# Patient Record
Sex: MALE | Race: WHITE | NOT HISPANIC OR LATINO | URBAN - METROPOLITAN AREA
[De-identification: names, ages, dates, MRNs, and addresses within clinical notes are randomized per-mention and may not be internally consistent; named-entity substitution may affect disease eponyms.]

---

## 2018-07-17 ENCOUNTER — INPATIENT (INPATIENT)
Facility: HOSPITAL | Age: 68
LOS: 0 days | End: 2018-07-18
Attending: FAMILY MEDICINE | Admitting: FAMILY MEDICINE
Payer: MEDICARE

## 2018-07-17 VITALS
SYSTOLIC BLOOD PRESSURE: 141 MMHG | RESPIRATION RATE: 18 BRPM | HEART RATE: 56 BPM | DIASTOLIC BLOOD PRESSURE: 79 MMHG | OXYGEN SATURATION: 97 % | TEMPERATURE: 98 F | WEIGHT: 177.91 LBS

## 2018-07-17 DIAGNOSIS — Z98.890 OTHER SPECIFIED POSTPROCEDURAL STATES: Chronic | ICD-10-CM

## 2018-07-17 DIAGNOSIS — Z90.49 ACQUIRED ABSENCE OF OTHER SPECIFIED PARTS OF DIGESTIVE TRACT: Chronic | ICD-10-CM

## 2018-07-17 LAB
ALBUMIN SERPL ELPH-MCNC: 3.5 G/DL — SIGNIFICANT CHANGE UP (ref 3.3–5)
ALP SERPL-CCNC: 75 U/L — SIGNIFICANT CHANGE UP (ref 40–120)
ALT FLD-CCNC: 30 U/L — SIGNIFICANT CHANGE UP (ref 12–78)
AMPHET UR-MCNC: NEGATIVE — SIGNIFICANT CHANGE UP
ANION GAP SERPL CALC-SCNC: 8 MMOL/L — SIGNIFICANT CHANGE UP (ref 5–17)
APAP SERPL-MCNC: <2 UG/ML — LOW (ref 10–30)
APPEARANCE UR: CLEAR — SIGNIFICANT CHANGE UP
APTT BLD: 25.9 SEC — LOW (ref 27.5–37.4)
AST SERPL-CCNC: 37 U/L — SIGNIFICANT CHANGE UP (ref 15–37)
BARBITURATES UR SCN-MCNC: NEGATIVE — SIGNIFICANT CHANGE UP
BASOPHILS # BLD AUTO: 0.03 K/UL — SIGNIFICANT CHANGE UP (ref 0–0.2)
BASOPHILS NFR BLD AUTO: 0.5 % — SIGNIFICANT CHANGE UP (ref 0–2)
BENZODIAZ UR-MCNC: NEGATIVE — SIGNIFICANT CHANGE UP
BILIRUB SERPL-MCNC: 0.4 MG/DL — SIGNIFICANT CHANGE UP (ref 0.2–1.2)
BILIRUB UR-MCNC: NEGATIVE — SIGNIFICANT CHANGE UP
BUN SERPL-MCNC: 16 MG/DL — SIGNIFICANT CHANGE UP (ref 7–23)
CALCIUM SERPL-MCNC: 8.3 MG/DL — LOW (ref 8.5–10.1)
CHLORIDE SERPL-SCNC: 109 MMOL/L — HIGH (ref 96–108)
CK SERPL-CCNC: 74 U/L — SIGNIFICANT CHANGE UP (ref 26–308)
CO2 SERPL-SCNC: 25 MMOL/L — SIGNIFICANT CHANGE UP (ref 22–31)
COCAINE METAB.OTHER UR-MCNC: NEGATIVE — SIGNIFICANT CHANGE UP
COLOR SPEC: YELLOW — SIGNIFICANT CHANGE UP
COMMENT - URINE: SIGNIFICANT CHANGE UP
CREAT SERPL-MCNC: 0.81 MG/DL — SIGNIFICANT CHANGE UP (ref 0.5–1.3)
DIFF PNL FLD: NEGATIVE — SIGNIFICANT CHANGE UP
EOSINOPHIL # BLD AUTO: 0.07 K/UL — SIGNIFICANT CHANGE UP (ref 0–0.5)
EOSINOPHIL NFR BLD AUTO: 1.2 % — SIGNIFICANT CHANGE UP (ref 0–6)
EPI CELLS # UR: SIGNIFICANT CHANGE UP
ERYTHROCYTE [SEDIMENTATION RATE] IN BLOOD: 6 MM/HR — SIGNIFICANT CHANGE UP (ref 0–20)
ETHANOL SERPL-MCNC: <10 MG/DL — SIGNIFICANT CHANGE UP (ref 0–10)
GLUCOSE SERPL-MCNC: 123 MG/DL — HIGH (ref 70–99)
GLUCOSE UR QL: NEGATIVE MG/DL — SIGNIFICANT CHANGE UP
HCT VFR BLD CALC: 41.1 % — SIGNIFICANT CHANGE UP (ref 39–50)
HGB BLD-MCNC: 14.1 G/DL — SIGNIFICANT CHANGE UP (ref 13–17)
IMM GRANULOCYTES NFR BLD AUTO: 0.3 % — SIGNIFICANT CHANGE UP (ref 0–1.5)
INR BLD: 1.04 RATIO — SIGNIFICANT CHANGE UP (ref 0.88–1.16)
KETONES UR-MCNC: NEGATIVE — SIGNIFICANT CHANGE UP
LACTATE SERPL-SCNC: 1.2 MMOL/L — SIGNIFICANT CHANGE UP (ref 0.7–2)
LEUKOCYTE ESTERASE UR-ACNC: ABNORMAL
LYMPHOCYTES # BLD AUTO: 0.85 K/UL — LOW (ref 1–3.3)
LYMPHOCYTES # BLD AUTO: 14.1 % — SIGNIFICANT CHANGE UP (ref 13–44)
MCHC RBC-ENTMCNC: 31.7 PG — SIGNIFICANT CHANGE UP (ref 27–34)
MCHC RBC-ENTMCNC: 34.3 GM/DL — SIGNIFICANT CHANGE UP (ref 32–36)
MCV RBC AUTO: 92.4 FL — SIGNIFICANT CHANGE UP (ref 80–100)
METHADONE UR-MCNC: NEGATIVE — SIGNIFICANT CHANGE UP
MONOCYTES # BLD AUTO: 0.35 K/UL — SIGNIFICANT CHANGE UP (ref 0–0.9)
MONOCYTES NFR BLD AUTO: 5.8 % — SIGNIFICANT CHANGE UP (ref 2–14)
NEUTROPHILS # BLD AUTO: 4.71 K/UL — SIGNIFICANT CHANGE UP (ref 1.8–7.4)
NEUTROPHILS NFR BLD AUTO: 78.1 % — HIGH (ref 43–77)
NITRITE UR-MCNC: NEGATIVE — SIGNIFICANT CHANGE UP
NRBC # BLD: 0 /100 WBCS — SIGNIFICANT CHANGE UP (ref 0–0)
OPIATES UR-MCNC: NEGATIVE — SIGNIFICANT CHANGE UP
PCP SPEC-MCNC: SIGNIFICANT CHANGE UP
PCP SPEC-MCNC: SIGNIFICANT CHANGE UP
PCP UR-MCNC: NEGATIVE — SIGNIFICANT CHANGE UP
PH UR: 5 — SIGNIFICANT CHANGE UP (ref 5–8)
PLATELET # BLD AUTO: 181 K/UL — SIGNIFICANT CHANGE UP (ref 150–400)
POTASSIUM SERPL-MCNC: 4 MMOL/L — SIGNIFICANT CHANGE UP (ref 3.5–5.3)
POTASSIUM SERPL-SCNC: 4 MMOL/L — SIGNIFICANT CHANGE UP (ref 3.5–5.3)
PROT SERPL-MCNC: 6.6 GM/DL — SIGNIFICANT CHANGE UP (ref 6–8.3)
PROT UR-MCNC: 15 MG/DL
PROTHROM AB SERPL-ACNC: 11.2 SEC — SIGNIFICANT CHANGE UP (ref 9.8–12.7)
RBC # BLD: 4.45 M/UL — SIGNIFICANT CHANGE UP (ref 4.2–5.8)
RBC # FLD: 12.1 % — SIGNIFICANT CHANGE UP (ref 10.3–14.5)
RBC CASTS # UR COMP ASSIST: NEGATIVE /HPF — SIGNIFICANT CHANGE UP (ref 0–4)
SALICYLATES SERPL-MCNC: 2.8 MG/DL — SIGNIFICANT CHANGE UP (ref 2.8–20)
SODIUM SERPL-SCNC: 142 MMOL/L — SIGNIFICANT CHANGE UP (ref 135–145)
SP GR SPEC: 1.02 — SIGNIFICANT CHANGE UP (ref 1.01–1.02)
THC UR QL: NEGATIVE — SIGNIFICANT CHANGE UP
TROPONIN I SERPL-MCNC: <0.015 NG/ML — SIGNIFICANT CHANGE UP (ref 0.01–0.04)
TSH SERPL-MCNC: 1.12 UU/ML — SIGNIFICANT CHANGE UP (ref 0.36–3.74)
UROBILINOGEN FLD QL: NEGATIVE MG/DL — SIGNIFICANT CHANGE UP
WBC # BLD: 6.03 K/UL — SIGNIFICANT CHANGE UP (ref 3.8–10.5)
WBC # FLD AUTO: 6.03 K/UL — SIGNIFICANT CHANGE UP (ref 3.8–10.5)
WBC UR QL: SIGNIFICANT CHANGE UP

## 2018-07-17 PROCEDURE — 99221 1ST HOSP IP/OBS SF/LOW 40: CPT

## 2018-07-17 PROCEDURE — 71045 X-RAY EXAM CHEST 1 VIEW: CPT | Mod: 26

## 2018-07-17 PROCEDURE — 70498 CT ANGIOGRAPHY NECK: CPT | Mod: 26

## 2018-07-17 PROCEDURE — 93010 ELECTROCARDIOGRAM REPORT: CPT

## 2018-07-17 PROCEDURE — 70496 CT ANGIOGRAPHY HEAD: CPT | Mod: 26

## 2018-07-17 PROCEDURE — 99285 EMERGENCY DEPT VISIT HI MDM: CPT

## 2018-07-17 RX ORDER — LOSARTAN POTASSIUM 100 MG/1
1 TABLET, FILM COATED ORAL
Qty: 0 | Refills: 0 | COMMUNITY

## 2018-07-17 RX ORDER — ENOXAPARIN SODIUM 100 MG/ML
40 INJECTION SUBCUTANEOUS EVERY 24 HOURS
Qty: 0 | Refills: 0 | Status: DISCONTINUED | OUTPATIENT
Start: 2018-07-17 | End: 2018-07-18

## 2018-07-17 RX ORDER — SODIUM CHLORIDE 9 MG/ML
2000 INJECTION INTRAMUSCULAR; INTRAVENOUS; SUBCUTANEOUS ONCE
Qty: 0 | Refills: 0 | Status: COMPLETED | OUTPATIENT
Start: 2018-07-17 | End: 2018-07-17

## 2018-07-17 RX ORDER — PYRIDOXINE HCL (VITAMIN B6) 100 MG
1 TABLET ORAL
Qty: 0 | Refills: 0 | COMMUNITY

## 2018-07-17 RX ORDER — DIPHENHYDRAMINE HCL 50 MG
25 CAPSULE ORAL ONCE
Qty: 0 | Refills: 0 | Status: DISCONTINUED | OUTPATIENT
Start: 2018-07-17 | End: 2018-07-17

## 2018-07-17 RX ORDER — PREGABALIN 225 MG/1
1 CAPSULE ORAL
Qty: 0 | Refills: 0 | COMMUNITY

## 2018-07-17 RX ORDER — PREGABALIN 225 MG/1
1000 CAPSULE ORAL DAILY
Qty: 0 | Refills: 0 | Status: DISCONTINUED | OUTPATIENT
Start: 2018-07-17 | End: 2018-07-18

## 2018-07-17 RX ORDER — LOSARTAN POTASSIUM 100 MG/1
50 TABLET, FILM COATED ORAL
Qty: 0 | Refills: 0 | Status: DISCONTINUED | OUTPATIENT
Start: 2018-07-17 | End: 2018-07-18

## 2018-07-17 RX ORDER — FOLIC ACID 0.8 MG
1 TABLET ORAL DAILY
Qty: 0 | Refills: 0 | Status: DISCONTINUED | OUTPATIENT
Start: 2018-07-17 | End: 2018-07-18

## 2018-07-17 RX ORDER — DIPHENHYDRAMINE HCL 50 MG
25 CAPSULE ORAL ONCE
Qty: 0 | Refills: 0 | Status: COMPLETED | OUTPATIENT
Start: 2018-07-17 | End: 2018-07-17

## 2018-07-17 RX ORDER — SODIUM CHLORIDE 9 MG/ML
1000 INJECTION INTRAMUSCULAR; INTRAVENOUS; SUBCUTANEOUS
Qty: 0 | Refills: 0 | Status: DISCONTINUED | OUTPATIENT
Start: 2018-07-17 | End: 2018-07-18

## 2018-07-17 RX ORDER — FOLIC ACID 0.8 MG
1 TABLET ORAL
Qty: 0 | Refills: 0 | COMMUNITY

## 2018-07-17 RX ORDER — PYRIDOXINE HCL (VITAMIN B6) 100 MG
100 TABLET ORAL DAILY
Qty: 0 | Refills: 0 | Status: DISCONTINUED | OUTPATIENT
Start: 2018-07-17 | End: 2018-07-18

## 2018-07-17 RX ADMIN — SODIUM CHLORIDE 75 MILLILITER(S): 9 INJECTION INTRAMUSCULAR; INTRAVENOUS; SUBCUTANEOUS at 19:07

## 2018-07-17 RX ADMIN — LOSARTAN POTASSIUM 50 MILLIGRAM(S): 100 TABLET, FILM COATED ORAL at 21:08

## 2018-07-17 RX ADMIN — SODIUM CHLORIDE 2000 MILLILITER(S): 9 INJECTION INTRAMUSCULAR; INTRAVENOUS; SUBCUTANEOUS at 10:50

## 2018-07-17 NOTE — PATIENT PROFILE ADULT. - PMH
B12 nutritional deficiency    HTN (hypertension)    MARLENY (obstructive sleep apnea)  needs sleep study

## 2018-07-17 NOTE — ED PROVIDER NOTE - MEDICAL DECISION MAKING DETAILS
68 y/o male with PMHx of HTN PSHx of appendectomy presents to the ED c/o lethargy starting today while on the way to work around 5:45am. VSS. Pt drowsy and mild delirious. NIH 0. No HA, no fever. DDX. metabolic/hematologic dyspraxia. Plan for CT head and CT contrast of head and neck given shellfish allergy will premedicate even though no contra indication in literature r/o ACS, CXR eval for infection/uti, tox screen due to ETOH, symptomatic treatment, likely admit for AMS. 67 YOM PMHx of HTN PSHx of appendectomy presents to the ED c/o lethargy starting today while on the way to work around 5:45am. VSS. Pt drowsy and mild delirious on exam. NIH 0 on arrival.  Repeat NIH during interview unchanged No HA, no fever. DDX. metabolic/hematologic dyscrasia, ICH, delirium.  Plan for CT head and CT contrast of head and neck. Given shellfish allergy will premedicate.  R/o ACS, CXR eval for infection, urine studies to evaluate for uti, tox screen due to ETOH use, symptomatic treatment, likely admit for AMS.

## 2018-07-17 NOTE — CONSULT NOTE ADULT - ASSESSMENT
67 year old man with sudden onset of feeling of intense sleepiness/lethargy.  Neurological examination is non-focal and workup thus far is negative.  Likely excessive daytime sleepiness due to chronic sleep deprivation, probable MARLENY.  However, given that this event was atypical for the patient, it is worthwhile to check MRI brain.  Will follow.

## 2018-07-17 NOTE — ED ADULT NURSE NOTE - OBJECTIVE STATEMENT
Pt bib coworker who states he was falling asleep at work and that he states he felt weak.  Pt appears lethargic, appears to be sleeping throughout assessment but does respond to voice.  Pt is afebrile, moves all extremities, ambulates well, PERRLA, neuro intact.

## 2018-07-17 NOTE — ED ADULT TRIAGE NOTE - CHIEF COMPLAINT QUOTE
pt presents to ED  due to lethargy pt was driving to work this AM when he became suddenly weak and had to pull over, some confusion noted  pt was having difficulty staying awake pt eval by MD Coyle no CODE STROKE at this time no speech difficulties, no deficits, as per EMS ambulates with a steady gait

## 2018-07-17 NOTE — H&P ADULT - ASSESSMENT
66 y/o male     delirium, can be seoncondary to tia/cva vs hypoxemia vs metobolic disorder, tropx1-neg, cxr- no acute infiltrate, cth-neg  -admit to tele   -ecg in am  -neurochecks q4h  -mri head/neck   -check b12, folate, rpr, tsh, ebv, lyme, esr, crp, cpk  -get abg  -trend troponin  -check bcx, ucx  -gentle iv hydration   -neurology consult  -will need sleep study as outpt    htn   -cont home meds    disposition   -dc home when stable 68 y/o male     lethergy, resolved. can be secondary to tia/cva vs hypoxemia vs metabolic disorder, tropx1-neg, cxr- no acute infiltrate, cth-neg  -admit to tele   -ecg in am  -neurochecks q4h  -mri head/neck   -check b12, folate, rpr, tsh, ebv, lyme, esr, crp, cpk  -get abg  -trend troponin  -check bcx, ucx  -gentle iv hydration   -neurology consult  -will need sleep study as outpt    htn   -cont home meds    disposition   -dc home when stable

## 2018-07-17 NOTE — ED PROVIDER NOTE - NS_ ATTENDINGSCRIBEDETAILS _ED_A_ED_FT
The scribe's documentation has been prepared under my direction and personally reviewed by me in its entirety.  I confirm that the note above accurately reflects all my work, treatment, procedures, and decision making except where otherwise noted or amended by me.  Gavin Coyle M.D.

## 2018-07-17 NOTE — ED PROVIDER NOTE - PROGRESS NOTE DETAILS
ALBINA No significant findings during evaluation today.  Given Pt's delirium PT admitted for AMS.  Given presentation and findings, patient requires hospitalization likely requiring 3 days of inpatient care.  Patient signed out to Dr. Roth.  Patient's history, vital signs, lab results, imaging, pertinent findings, and clinical condition reviewed during sign out.  At sign out patient admitted in hemodynamically stable condition in no acute distress.

## 2018-07-17 NOTE — CONSULT NOTE ADULT - SUBJECTIVE AND OBJECTIVE BOX
Patient is a 67y old  Male who presents with a chief complaint of fatigue, generalized weakness (2018 15:58)      HPI:  68 y/o male with h/o htn, vit b12 deficency, presents to  c/o extreme fatigue and drowsiness while driving to work this morning. He was about 1/2 mile to work when he suddenly felt an overwhelming feeling of sleepiness. When he got to work he went to take a brief nap. He slept for about 10 minutes and when he was awoken he felt slightly disoriented as if he had come out of a deep sleep. Otherwise he had no confusion. He denied any focal weakness. He denies preceding lightheadedness, dizziness, chest pain, shortness of breath, nausea or vomiting.   When a coworker came into the break room, he felt off enough that he told his coworker that he should go to the hospital.  He has chronic difficulty sleeping and is very restless. This was exacerbated over the last several days because he was babysitting a dog. He has felt very sleepy. His wife states that he snores when he is supine and she has heard pauses in his breathing during sleep. He was supposed to have a sleep study but did not follow through.   He has bouts of feeling generalized weakness from time to time. He has attributed this to low vitamin B12 levels, but improvement in his vitamin B12 has not seemed to improve this as per his wife.       In ED, tropx1-neg, cth-neg, vital stable. as per ED attending, patient was unresponsive to painful stimuli upon arrival, better now, aaox3.  s/p 2L NS      PAST MEDICAL HISTORY:  as below    PAST SURGICAL HISTORY: as below    FAMILY HISTORY:  +father had h/o cad,  in 50s due to mi, was obese, heavy smoker and drinker (2018 15:58)      PAST MEDICAL & SURGICAL HISTORY:  History of appendectomy      FAMILY HISTORY:      Social Hx:  Nonsmoker, no drug or alcohol use    MEDICATIONS  (STANDING):  cyanocobalamin 1000 MICROGram(s) Oral daily  enoxaparin Injectable 40 milliGRAM(s) SubCutaneous every 24 hours  folic acid 1 milliGRAM(s) Oral daily  losartan 50 milliGRAM(s) Oral two times a day  pyridoxine 100 milliGRAM(s) Oral daily  sodium chloride 0.9%. 1000 milliLiter(s) (75 mL/Hr) IV Continuous <Continuous>       Allergies    No Known Drug Allergies  shellfish (Unknown)    Intolerances        ROS: Pertinent positives in HPI, all other ROS were reviewed and are negative.      Vital Signs Last 24 Hrs  T(C): 36.4 (2018 17:20), Max: 36.7 (2018 12:02)  T(F): 97.6 (2018 17:20), Max: 98.1 (2018 12:02)  HR: 55 (2018 17:20) (55 - 62)  BP: 126/62 (2018 17:20) (126/62 - 141/79)  BP(mean): --  RR: 17 (2018 16:24) (17 - 18)  SpO2: 98% (2018 17:20) (97% - 100%)        Constitutional: awake and alert.  HEENT: PERRLA, EOMI,   Neck: Supple.  Respiratory: Breath sounds are clear bilaterally  Cardiovascular: S1 and S2, regular / irregular rhythm  Extremities:  no edema  Vascular: Carotid Bruit - no  Musculoskeletal: no joint swelling/tenderness, no abnormal movements  Skin: No rashes    Neurological exam:  HF: A x O x 3. Appropriately interactive, normal affect. Speech fluent, No Aphasia or paraphasic errors. Naming /repetition intact   CN: LUDWIN, EOMI, VFF, facial sensation normal, no NLFD, tongue midline, Palate moves equally, SCM equal bilaterally  Motor: No pronator drift, Strength 5/5 in all 4 ext, normal bulk and tone, no tremor, rigidity or bradykinesia.    Sens: Intact to light touch / PP/ VS/ JS    Reflexes: Symmetric,, downgoing toes b/l  Coord:  No FNFA, dysmetria, ZACH intact   Gait/Balance: deferred        Labs:       142  |  109<H>  |  16  ----------------------------<  123<H>  4.0   |  25  |  0.81    Ca    8.3<L>      2018 08:46    TPro  6.6  /  Alb  3.5  /  TBili  0.4  /  DBili  x   /  AST  37  /  ALT  30  /  AlkPhos  75                                14.1   6.03  )-----------( 181      ( 2018 08:46 )             41.1       Radiology:  CT brain/CTA head/CTA neck 18:  IMPRESSION:    HEADCT:  Unremarkable head CT.    CTA HEAD:  No significant stenosis, occlusion, or aneurysm.    Hypoplastic intradural right vertebral artery, a known variant.    CTA NECK:  No cervical stenosis or occlusion of the bilateral common/internal   carotid and vertebral arteries.

## 2018-07-17 NOTE — H&P ADULT - NEGATIVE NEUROLOGICAL SYMPTOMS
no transient paralysis/no paresthesias/no difficulty walking/no headache/no generalized seizures/no syncope/no loss of sensation

## 2018-07-17 NOTE — ED PROVIDER NOTE - OBJECTIVE STATEMENT
66 y/o male with PMHx of HTN PSHx of appendectomy presents to the ED c/o lethargy starting today while on the way to work around 6am. Pt states his last normal was at 6am. Pt states he felt extremely weak and tired while driving and when he arrived at work he fell asleep. As per wife pt has been c/o HA last week and has a burst blood vessel in his eye. Pt states he had some confusion earlier but only because he was so tired. Denies CP, NVD, fever, chills, diaphoresis, abd pain, vision changes, dizziness, HA, numbness/tingling. EtOH- few glasses of wine a day. Nonsmoker. NKDA.

## 2018-07-17 NOTE — H&P ADULT - HISTORY OF PRESENT ILLNESS
66 y/o male with h/o htn, vit b12 deficency, presents to  c/o extreme fatigue and drowsiness while driving to work this morning.  unable to keep his eyes open and fell asleep at work.  denies any preceding lightheadedness, dizziness, slurred speech, chest pain, palpitations, headache, nausea/vomiting, diaphoresis or abdominal pain.  no prior episdoes in the past.  no recent illness, travel or sick contacts.  states hasnt been sleeping well in the last 4-5 days due to babysitting a dog.  was supposed to get testing to r/o sleep apnea in the past and never went.  was seen by cardiologist 2 years ago, ecg, echo and stress-normal.  no h/o sob on exertion      In ED, tropx1-neg, cth-neg, vital stable. as per ED attending, patient was unresponsive to painful stimuli upon arrival, better now, aaox3      PAST MEDICAL HISTORY:  as below    PAST SURGICAL HISTORY: as below    FAMILY HISTORY:  +father had h/o cad,  in 50s due to mi, was obese, heavy smoker and drinker 68 y/o male with h/o htn, vit b12 deficency, presents to  c/o extreme fatigue and drowsiness while driving to work this morning.  unable to keep his eyes open and fell asleep at work.  denies any preceding lightheadedness, dizziness, slurred speech, chest pain, palpitations, headache, nausea/vomiting, diaphoresis or abdominal pain.  no prior episdoes in the past.  no recent illness, travel or sick contacts.  states hasnt been sleeping well in the last 4-5 days due to babysitting a dog.  was supposed to get testing to r/o sleep apnea in the past and never went.  was seen by cardiologist 2 years ago, ecg, echo and stress-normal.  no h/o sob on exertion      In ED, tropx1-neg, cth-neg, vital stable. as per ED attending, patient was unresponsive to painful stimuli upon arrival, better now, aaox3.  s/p 2L NS      PAST MEDICAL HISTORY:  as below    PAST SURGICAL HISTORY: as below    FAMILY HISTORY:  +father had h/o cad,  in 50s due to mi, was obese, heavy smoker and drinker

## 2018-07-18 VITALS — TEMPERATURE: 97 F

## 2018-07-18 LAB
ALBUMIN SERPL ELPH-MCNC: 3.1 G/DL — LOW (ref 3.3–5)
ALP SERPL-CCNC: 65 U/L — SIGNIFICANT CHANGE UP (ref 40–120)
ALT FLD-CCNC: 27 U/L — SIGNIFICANT CHANGE UP (ref 12–78)
ANION GAP SERPL CALC-SCNC: 5 MMOL/L — SIGNIFICANT CHANGE UP (ref 5–17)
AST SERPL-CCNC: 34 U/L — SIGNIFICANT CHANGE UP (ref 15–37)
BASOPHILS # BLD AUTO: 0.03 K/UL — SIGNIFICANT CHANGE UP (ref 0–0.2)
BASOPHILS NFR BLD AUTO: 0.6 % — SIGNIFICANT CHANGE UP (ref 0–2)
BILIRUB SERPL-MCNC: 0.6 MG/DL — SIGNIFICANT CHANGE UP (ref 0.2–1.2)
BUN SERPL-MCNC: 13 MG/DL — SIGNIFICANT CHANGE UP (ref 7–23)
CALCIUM SERPL-MCNC: 8.6 MG/DL — SIGNIFICANT CHANGE UP (ref 8.5–10.1)
CHLORIDE SERPL-SCNC: 109 MMOL/L — HIGH (ref 96–108)
CHOLEST SERPL-MCNC: 137 MG/DL — SIGNIFICANT CHANGE UP (ref 10–199)
CO2 SERPL-SCNC: 27 MMOL/L — SIGNIFICANT CHANGE UP (ref 22–31)
CREAT SERPL-MCNC: 0.7 MG/DL — SIGNIFICANT CHANGE UP (ref 0.5–1.3)
CRP SERPL-MCNC: 0.29 MG/DL — SIGNIFICANT CHANGE UP (ref 0–0.4)
CULTURE RESULTS: NO GROWTH — SIGNIFICANT CHANGE UP
EOSINOPHIL # BLD AUTO: 0.14 K/UL — SIGNIFICANT CHANGE UP (ref 0–0.5)
EOSINOPHIL NFR BLD AUTO: 2.9 % — SIGNIFICANT CHANGE UP (ref 0–6)
FOLATE SERPL-MCNC: >20 NG/ML — SIGNIFICANT CHANGE UP
GLUCOSE SERPL-MCNC: 93 MG/DL — SIGNIFICANT CHANGE UP (ref 70–99)
HAV IGM SER-ACNC: SIGNIFICANT CHANGE UP
HBV CORE IGM SER-ACNC: SIGNIFICANT CHANGE UP
HBV SURFACE AG SER-ACNC: SIGNIFICANT CHANGE UP
HCT VFR BLD CALC: 39.7 % — SIGNIFICANT CHANGE UP (ref 39–50)
HCV AB S/CO SERPL IA: 0.15 S/CO — SIGNIFICANT CHANGE UP
HCV AB SERPL-IMP: SIGNIFICANT CHANGE UP
HDLC SERPL-MCNC: 53 MG/DL — SIGNIFICANT CHANGE UP (ref 40–125)
HGB BLD-MCNC: 13.5 G/DL — SIGNIFICANT CHANGE UP (ref 13–17)
IMM GRANULOCYTES NFR BLD AUTO: 0.2 % — SIGNIFICANT CHANGE UP (ref 0–1.5)
LIPID PNL WITH DIRECT LDL SERPL: 64 MG/DL — SIGNIFICANT CHANGE UP
LYMPHOCYTES # BLD AUTO: 1.63 K/UL — SIGNIFICANT CHANGE UP (ref 1–3.3)
LYMPHOCYTES # BLD AUTO: 34.3 % — SIGNIFICANT CHANGE UP (ref 13–44)
MAGNESIUM SERPL-MCNC: 2 MG/DL — SIGNIFICANT CHANGE UP (ref 1.6–2.6)
MCHC RBC-ENTMCNC: 32 PG — SIGNIFICANT CHANGE UP (ref 27–34)
MCHC RBC-ENTMCNC: 34 GM/DL — SIGNIFICANT CHANGE UP (ref 32–36)
MCV RBC AUTO: 94.1 FL — SIGNIFICANT CHANGE UP (ref 80–100)
MONOCYTES # BLD AUTO: 0.45 K/UL — SIGNIFICANT CHANGE UP (ref 0–0.9)
MONOCYTES NFR BLD AUTO: 9.5 % — SIGNIFICANT CHANGE UP (ref 2–14)
NEUTROPHILS # BLD AUTO: 2.49 K/UL — SIGNIFICANT CHANGE UP (ref 1.8–7.4)
NEUTROPHILS NFR BLD AUTO: 52.5 % — SIGNIFICANT CHANGE UP (ref 43–77)
NRBC # BLD: 0 /100 WBCS — SIGNIFICANT CHANGE UP (ref 0–0)
PHOSPHATE SERPL-MCNC: 2.4 MG/DL — LOW (ref 2.5–4.5)
PLATELET # BLD AUTO: 165 K/UL — SIGNIFICANT CHANGE UP (ref 150–400)
POTASSIUM SERPL-MCNC: 4 MMOL/L — SIGNIFICANT CHANGE UP (ref 3.5–5.3)
POTASSIUM SERPL-SCNC: 4 MMOL/L — SIGNIFICANT CHANGE UP (ref 3.5–5.3)
PROT SERPL-MCNC: 5.8 GM/DL — LOW (ref 6–8.3)
RBC # BLD: 4.22 M/UL — SIGNIFICANT CHANGE UP (ref 4.2–5.8)
RBC # FLD: 12.2 % — SIGNIFICANT CHANGE UP (ref 10.3–14.5)
SODIUM SERPL-SCNC: 141 MMOL/L — SIGNIFICANT CHANGE UP (ref 135–145)
SPECIMEN SOURCE: SIGNIFICANT CHANGE UP
T4 AB SER-ACNC: 5.1 UG/DL — SIGNIFICANT CHANGE UP (ref 4.6–12)
TOTAL CHOLESTEROL/HDL RATIO MEASUREMENT: 2.6 RATIO — LOW (ref 3.4–9.6)
TRIGL SERPL-MCNC: 99 MG/DL — SIGNIFICANT CHANGE UP (ref 10–149)
VIT B12 SERPL-MCNC: >2000 PG/ML — HIGH (ref 232–1245)
WBC # BLD: 4.75 K/UL — SIGNIFICANT CHANGE UP (ref 3.8–10.5)
WBC # FLD AUTO: 4.75 K/UL — SIGNIFICANT CHANGE UP (ref 3.8–10.5)

## 2018-07-18 PROCEDURE — 99232 SBSQ HOSP IP/OBS MODERATE 35: CPT

## 2018-07-18 PROCEDURE — 93010 ELECTROCARDIOGRAM REPORT: CPT

## 2018-07-18 PROCEDURE — 70547 MR ANGIOGRAPHY NECK W/O DYE: CPT | Mod: 26

## 2018-07-18 PROCEDURE — 70551 MRI BRAIN STEM W/O DYE: CPT | Mod: 26

## 2018-07-18 RX ORDER — ALPRAZOLAM 0.25 MG
0.25 TABLET ORAL ONCE
Qty: 0 | Refills: 0 | Status: DISCONTINUED | OUTPATIENT
Start: 2018-07-18 | End: 2018-07-18

## 2018-07-18 RX ADMIN — PREGABALIN 1000 MICROGRAM(S): 225 CAPSULE ORAL at 11:19

## 2018-07-18 RX ADMIN — Medication 100 MILLIGRAM(S): at 11:19

## 2018-07-18 RX ADMIN — Medication 1 MILLIGRAM(S): at 11:19

## 2018-07-18 RX ADMIN — LOSARTAN POTASSIUM 50 MILLIGRAM(S): 100 TABLET, FILM COATED ORAL at 06:00

## 2018-07-18 RX ADMIN — Medication 0.25 MILLIGRAM(S): at 13:34

## 2018-07-18 NOTE — DISCHARGE NOTE ADULT - CARE PROVIDERS DIRECT ADDRESSES
,DirectAddress_Unknown,[email or web address]

## 2018-07-18 NOTE — PROGRESS NOTE ADULT - SUBJECTIVE AND OBJECTIVE BOX
Interval History: He is feeling much better today. Denies any dizziness or focal weakness.    MEDICATIONS  (STANDING):  ALPRAZolam 0.25 milliGRAM(s) Oral once  cyanocobalamin 1000 MICROGram(s) Oral daily  enoxaparin Injectable 40 milliGRAM(s) SubCutaneous every 24 hours  folic acid 1 milliGRAM(s) Oral daily  losartan 50 milliGRAM(s) Oral two times a day  pyridoxine 100 milliGRAM(s) Oral daily  sodium chloride 0.9%. 1000 milliLiter(s) (75 mL/Hr) IV Continuous <Continuous>    MEDICATIONS  (PRN):      Allergies    No Known Drug Allergies  shellfish (Unknown)    Intolerances        PHYSICAL EXAM:  Vital Signs Last 24 Hrs  T(F): 97.7 (18 @ 06:10)  HR: 54 (18 @ 07:00)  BP: 129/73 (18 @ 07:00)  RR: 17 (18 @ 16:24)    GENERAL: NAD, well-groomed, well-developed  HEAD:  Atraumatic, Normocephalic  Neuro:  Awake, alert, no aphasia  CN: PERRL, EOMI, no nystagmus, no facial weakness, tongue protrudes in the midline  motor: normal tone, no pronator drift, full strength in all four extremities  sensory: intact to light touch  coordination: finger to nose intact bilaterally  DTRs: symmetric, plantar responses flexor bilaterally    LABS:                        13.5   4.75  )-----------( 165      ( 2018 04:37 )             39.7     07-18    141  |  109<H>  |  13  ----------------------------<  93  4.0   |  27  |  0.70    Ca    8.6      2018 04:37  Phos  2.4     07-18  Mg     2.0     -18    TPro  5.8<L>  /  Alb  3.1<L>  /  TBili  0.6  /  DBili  x   /  AST  34  /  ALT  27  /  AlkPhos  65  07-18    PT/INR - ( 2018 08:46 )   PT: 11.2 sec;   INR: 1.04 ratio         PTT - ( 2018 08:46 )  PTT:25.9 sec  Urinalysis Basic - ( 2018 11:15 )    Color: Yellow / Appearance: Clear / S.020 / pH: x  Gluc: x / Ketone: Negative  / Bili: Negative / Urobili: Negative mg/dL   Blood: x / Protein: 15 mg/dL / Nitrite: Negative   Leuk Esterase: Trace / RBC: Negative /HPF / WBC 0-2   Sq Epi: x / Non Sq Epi: Few / Bacteria: x        RADIOLOGY & ADDITIONAL STUDIES:  CT brain/CTA head/CTA neck 18:   IMPRESSION:    HEADCT:  Unremarkable head CT.    CTA HEAD:  No significant stenosis, occlusion, or aneurysm.    Hypoplastic intradural right vertebral artery, a known variant.    CTA NECK:  No cervical stenosis or occlusion of the bilateral common/internal   carotid and vertebral arteries.

## 2018-07-18 NOTE — DISCHARGE NOTE ADULT - PATIENT PORTAL LINK FT
You can access the [facility] Patient Portal, offered by [facility], by registering with the following website: [email or web address]

## 2018-07-18 NOTE — DISCHARGE NOTE ADULT - MEDICATION SUMMARY - MEDICATIONS TO TAKE
I will START or STAY ON the medications listed below when I get home from the hospital:    losartan 50 mg oral tablet  -- 1 tab(s) by mouth 2 times a day  -- Indication: For Htn    Vitamin B12 1000 mcg oral tablet  -- 1 tab(s) by mouth once a day  -- Indication: For B12 supplement    Vitamin B6 100 mg oral tablet  -- 1 tab(s) by mouth once a day  -- Indication: For Supplement    folic acid 1 mg oral tablet  -- 1 tab(s) by mouth once a day  -- Indication: For Supplement

## 2018-07-18 NOTE — DISCHARGE NOTE ADULT - CARE PLAN
Principal Discharge DX:	Sleep deprivation  Goal:	outpt sleep study to rule out MARLENY  Assessment and plan of treatment:	as above

## 2018-07-18 NOTE — DISCHARGE NOTE ADULT - HOSPITAL COURSE
HPI:  66 y/o male with h/o htn, vit b12 deficency, presents to  c/o extreme fatigue and drowsiness while driving to work this morning.  unable to keep his eyes open and fell asleep at work.  denies any preceding lightheadedness, dizziness, slurred speech, chest pain, palpitations, headache, nausea/vomiting, diaphoresis or abdominal pain.  no prior episdoes in the past.  no recent illness, travel or sick contacts.  states hasnt been sleeping well in the last 4-5 days due to babysitting a dog.  was supposed to get testing to r/o sleep apnea in the past and never went.  was seen by cardiologist 2 years ago, ecg, echo and stress-normal.  no h/o sob on exertion . Also has undergone cardiac stress testing of which was normal.  He denies recent use of ETOH. No drug use. No hx of seizures.  MRI/MRA did not point towards any acute pathology.   Pt admits he only sleeps 3 hrs nightly. Wife states he also snores and at time has periods of apnea. I suspect he is suffering from severe sleep deprivation with MARLENY. He will need outpt sleep study. DC to home. Avoid any ETOH or other OTC sedatives especially while handing heavy objects or driving.           ICU Vital Signs Last 24 Hrs  T(C): 36.2 (18 Jul 2018 16:07), Max: 36.5 (18 Jul 2018 06:10)  T(F): 97.1 (18 Jul 2018 16:07), Max: 97.7 (18 Jul 2018 06:10)  HR: 61 (18 Jul 2018 12:35) (54 - 67)  BP: 148/74 (18 Jul 2018 12:35) (115/69 - 148/74)  BP(mean): 94 (18 Jul 2018 12:35) (75 - 94)  ABP: --  ABP(mean): --  RR: --  SpO2: 98% (18 Jul 2018 12:35) (94% - 98%)      PHYSICAL EXAM:    Constitutional: NAD, awake and alert, well-developed  HEENT: PERR, EOMI, Normal Hearing, MMM  Neck: Soft and supple, No LAD, No JVD  Respiratory: Breath sounds are clear bilaterally, No wheezing, rales or rhonchi  Cardiovascular: S1 and S2, regular rate and rhythm, no Murmurs, gallops or rubs  Gastrointestinal: Bowel Sounds present, soft, nontender, nondistended, no guarding, no rebound  Extremities: No peripheral edema  Vascular: 2+ peripheral pulses  Neurological: A/O x 3, no focal deficits  Musculoskeletal: 5/5 strength b/l upper and lower extremities  Skin: No rashes            LABS: All Labs Reviewed:                   RADIOLOGY/EKG:    < from: MR Angio Head No Cont (07.18.18 @ 15:46) >  IMPRESSION:    MRI brain: No acute infarct, hemorrhage or mass lesion. There are   multiple small foci of T2/FLAIR hyperintensity in theperiventricular and   subcortical white matter suggestive of mild chronic microvascular   ischemic changes.     MRA brain: No hemodynamically significant stenosis.    MRA neck: No hemodynamically significant stenosis.    < end of copied text >

## 2018-07-18 NOTE — DISCHARGE NOTE ADULT - NS AS ACTIVITY OBS
Do not drive or operate machinery/No driving or operating heavy machinery if sx recurr; and refrain from any OTC sedative or ETOH as they can worsen/hasten symptoms

## 2018-07-18 NOTE — DISCHARGE NOTE ADULT - CARE PROVIDER_API CALL
Brayan Nobles), Internal Medicine  [address] [facility] [facility]  [address]  [place] [address]  Phone: [phone]  Fax: [phone]    Juarez Blakely), Neurology  [address] [place] [address]  [address] [place] [address]  Phone: [phone]  Fax: [phone]

## 2018-07-18 NOTE — PROGRESS NOTE ADULT - ASSESSMENT
67 year old man with sudden onset of feeling of intense sleepiness/lethargy.  Neurological examination is non-focal and workup thus far is negative.  Likely excessive daytime sleepiness due to chronic sleep deprivation, probable MARLENY.  Awaiting MRI brain today.

## 2018-07-19 LAB
B BURGDOR C6 AB SER-ACNC: NEGATIVE — SIGNIFICANT CHANGE UP
B BURGDOR IGG+IGM SER-ACNC: 0.22 INDEX — SIGNIFICANT CHANGE UP (ref 0.01–0.89)
EBV EA AB SER IA-ACNC: <5 U/ML — SIGNIFICANT CHANGE UP
EBV EA AB TITR SER IF: POSITIVE
EBV EA IGG SER-ACNC: NEGATIVE — SIGNIFICANT CHANGE UP
EBV NA IGG SER IA-ACNC: >600 U/ML — HIGH
EBV PATRN SPEC IB-IMP: SIGNIFICANT CHANGE UP
EBV VCA IGG AVIDITY SER QL IA: POSITIVE
EBV VCA IGM SER IA-ACNC: <10 U/ML — SIGNIFICANT CHANGE UP
EBV VCA IGM SER IA-ACNC: >750 U/ML — HIGH
EBV VCA IGM TITR FLD: NEGATIVE — SIGNIFICANT CHANGE UP
T PALLIDUM AB TITR SER: NEGATIVE — SIGNIFICANT CHANGE UP

## 2018-07-22 LAB
CULTURE RESULTS: SIGNIFICANT CHANGE UP
CULTURE RESULTS: SIGNIFICANT CHANGE UP
SPECIMEN SOURCE: SIGNIFICANT CHANGE UP
SPECIMEN SOURCE: SIGNIFICANT CHANGE UP

## 2018-07-24 DIAGNOSIS — Z72.820 SLEEP DEPRIVATION: ICD-10-CM

## 2018-07-24 DIAGNOSIS — G47.33 OBSTRUCTIVE SLEEP APNEA (ADULT) (PEDIATRIC): ICD-10-CM

## 2018-07-24 DIAGNOSIS — R53.1 WEAKNESS: ICD-10-CM

## 2018-07-24 DIAGNOSIS — E53.8 DEFICIENCY OF OTHER SPECIFIED B GROUP VITAMINS: ICD-10-CM

## 2018-07-24 DIAGNOSIS — I10 ESSENTIAL (PRIMARY) HYPERTENSION: ICD-10-CM

## 2018-07-24 DIAGNOSIS — Z79.899 OTHER LONG TERM (CURRENT) DRUG THERAPY: ICD-10-CM

## 2018-07-24 DIAGNOSIS — Z91.013 ALLERGY TO SEAFOOD: ICD-10-CM
